# Patient Record
Sex: FEMALE | Race: WHITE
[De-identification: names, ages, dates, MRNs, and addresses within clinical notes are randomized per-mention and may not be internally consistent; named-entity substitution may affect disease eponyms.]

---

## 2017-03-21 ENCOUNTER — HOSPITAL ENCOUNTER (OUTPATIENT)
Dept: HOSPITAL 35 - PAIN | Age: 62
End: 2017-03-21
Attending: ANESTHESIOLOGY
Payer: COMMERCIAL

## 2017-03-21 VITALS — DIASTOLIC BLOOD PRESSURE: 69 MMHG | SYSTOLIC BLOOD PRESSURE: 122 MMHG

## 2017-03-21 VITALS — BODY MASS INDEX: 24.8 KG/M2 | WEIGHT: 140 LBS | HEIGHT: 62.99 IN

## 2017-03-21 DIAGNOSIS — M79.662: Primary | ICD-10-CM

## 2017-03-21 DIAGNOSIS — F32.9: ICD-10-CM

## 2017-03-21 DIAGNOSIS — I10: ICD-10-CM

## 2017-03-21 DIAGNOSIS — M79.1: ICD-10-CM

## 2019-01-29 ENCOUNTER — HOSPITAL ENCOUNTER (OUTPATIENT)
Dept: HOSPITAL 35 - PAIN | Age: 64
End: 2019-01-29
Attending: ANESTHESIOLOGY
Payer: COMMERCIAL

## 2019-01-29 VITALS — BODY MASS INDEX: 25.09 KG/M2 | WEIGHT: 141.6 LBS | HEIGHT: 62.99 IN

## 2019-01-29 VITALS — DIASTOLIC BLOOD PRESSURE: 77 MMHG | SYSTOLIC BLOOD PRESSURE: 120 MMHG

## 2019-01-29 DIAGNOSIS — Z79.899: ICD-10-CM

## 2019-01-29 DIAGNOSIS — M47.22: Primary | ICD-10-CM

## 2019-01-29 NOTE — NUR
Pain Clinic Assessment:
 
1. History of Osteoarthritis:
Not Applicable
   History of Rheumatoid Arthritis:
Not Applicable
 
2. Height: 5 ft. 3 in. 160.0 cm.
   Weight: 141.6 lb.  oz. 64.229 kg.
   Patient's BMI: 25.1
 
3. Vital Signs:
   BP: 120/77 Pulse: 83 Resp: 16
   Temp:  02 Sat: 100 ECG Mon:
 
4. Pain Intensity: 9
 
5. Fall Risk:
   Dizziness: N  Needs help standing or walking: N
   Fallen in the last 3 months: N
   Fall risk comments:
 
 
6. Patient on Blood Thinner: None
 
7. History of Hypertension: Y
 
8. Opioid Therapy greater than 6 weeks: N
   Opiate Contract Signed:
 
9. Risk Assessment Tool Provided:
 
10. Functional Assessment Tool:
 
11. Recreational Drug Use: Never Drug Type:
    Tobacco Use: Never Smoker Tobacco Type:
       Amount or Packs/day:  How Many Years:
    Alcohol Use: No  Frequency:  Quant:

## 2019-01-29 NOTE — HPC
[facility]
[place]   [address]                     PAIN MANAGEMENT CONSULTATION  
_______________________________________________________________________________
 
Name:       AWILDAKASHMIR KIRA                Room #:                     [facility] 
M.R.#:      [id number]                       Account #:      [id number]  
Admission:  01/29/19    Attend Phys:    Benigno Silva DO  
Discharge:              Date of Birth:  [birth year]  
                                                          Report #: [id number]
                                                                    [id number]   
_______________________________________________________________________________
THIS REPORT FOR:   //name//                          
 
CC: Gabbi Silva
 
DATE OF SERVICE:  01/29/2019
 
 
CHIEF COMPLAINT:  Neck pain, left upper extremity pain and paresthesias.
 
HISTORY OF PRESENT ILLNESS:  As you know, the patient is a 63-year-old female
who reports acute onset of neck pain, left upper extremity pain and paresthesias
that began about 15 days ago.  The patient states she has been evaluated by
Neurology just recently who diagnosed her with a C5-C6 cervical radiculopathy. 
This was due to right-sided issues at that time.  She indicates today pain is
sharp, dull, numbness and tingling, places current pain score 9/10.  Pain is
exacerbated with activity, improves with nothing to date.  She has been referred
back to our clinic to discuss the possibility of undergoing a cervical epidural
injection under fluoroscopic guidance to address suspected cervical
radiculopathy.  The patient has planned to undergo MRI of the cervical spine
this Friday, which has been planned by her PCP.  The patient indicates no injury
or trauma that may have led to symptom occurrence.  She states she went to bed
and was in her normal state of health, awoke that next morning with pain that
has progressively worsened.
 
ALLERGIES:  HYDROCODONE, ACETAMINOPHEN, AUGMENTIN.
 
CURRENT MEDICATIONS:  Atorvastatin 20 mg per day, spironolactone 50 mg twice a
day, potassium bicarbonate 25 mEq p.r.n., diltiazem  mg per day,
hydrochlorothiazide 25 mg per day, nortriptyline 50 mg p.o. at bedtime,
rizatriptan 10 mg p.r.n., VESIcare 10 mg once a day, gabapentin 100 mg 3 times a
day, Tylenol #3 with Codeine 1 tab p.o. q.6 hours p.r.n. for pain, topiramate 25
mg b.i.d.
 
SOCIAL HISTORY:  The patient denies tobacco, alcohol, IV or illicit drug use. 
She is working, not receiving workmen's compensation, accompanied by her
, present in room today.
 
IMAGING:  There is no new imaging available.
 
PHYSICAL EXAMINATION:
VITAL SIGNS:  Blood pressure 120/77, pulse 83, respiratory rate 16 and
unlabored.  The patient is 100% on room air.  Height 5 feet 3 inches tall,
weight 141.6 pounds, BMI calculated 25.1.
GENERAL:  Well-developed, well-nourished, well-hydrated 63-year-old female
 
 
 
[place] [address]                     PAIN MANAGEMENT CONSULTATION  
_______________________________________________________________________________
 
Name:       KASHMIR KAISER KIRA                Room #:                     REG DARI FAY#:      [id number]                       Account #:      [id number]  
Admission:  01/29/19    Attend Phys:    Benigno Silva DO  
Discharge:              Date of Birth:  [birth year]  
                                                          Report #: [id number]
                                                                    [id number]   
_______________________________________________________________________________
appearing stated age.  She is placing current pain score at 9/10.
HEENT:  Normocephalic, atraumatic.  Pupils equal, round, reactive to light. 
Extraocular muscles are intact.  Sclerae nonicteric without injection.
NEUROLOGIC:  Cranial nerves 2-12 grossly intact.  Speech is fluent.  The patient
deemed a good historian.
LUNGS:  Clear, no wheeze, rhonchi or rales.
CARDIOVASCULAR:  Regular.  No appreciable gallop or rub.
EXTREMITIES:  Show no clubbing, no cyanosis, no edema.
MUSCULOSKELETAL:  Upper extremity strength appears symmetrical, 5/5.  Slight
giveaway strength noted with biceps flexion on the left when compared to the
right.  This is due to pain generation.  Muscle bulk and tone is symmetrical
when comparing left upper extremity to right.  Deep tendon reflexes are
symmetrical in bilateral upper extremities.  No focal deficits.  Spurling's test
positive left, negative right.  Cervical provocation testing is met with
increasing pain and decreasing capability of rotation and lateral flexion to the
left.  This is moderate.
 
ASSESSMENT:
1.  Cervical radiculopathy.
2.  Cervical spondylosis with radiculopathy.
 
PLAN:
1.  The patient has returned to our clinic per the request of her PCP to discuss
treatment options for cervical radiculopathy.  She comes to us today with no new
imaging, but does have recently a study for EMG of the upper extremities, which
showed right C5-C6 cervical radiculopathy.  She has been provided a prescription
to undergo MRI of the cervical spine on Friday of this week.  She was referred
to our clinic then to discuss treatment options.  We discussed the following
treatment options with the patient today for treating cervical radicular
symptoms.
 
We discussed physical therapy, stretching exercises and traction techniques as a
treatment option.  The patient is trying conservative stretching exercises at
home, but has not noted much in the way of improvement in symptoms.  We have
discussed medication management, adding neuropathic pain medication.  At
present, she is taking gabapentin 200 mg in morning, 200 mg at noon and 300 mg
at night, is noting no improvement in symptoms.  We can certainly escalate this
dose much more rapidly seeking for improvement in symptoms with reduced side
effects.  We can also rotate the patient to another medication.  We discussed
the cervical epidural injections under fluoroscopic guidance requested, but we
did indicate that if the patient undergoes a cervical epidural injection today,
she would not be able to undergo a cervical MRI as the injection will obscure
the findings of the MRI and we would not be able to get a true and complete
understanding of the pathology.  We typically require our patients to undergo
MRI imaging no sooner than 2 weeks after an epidural injection.  We also
discussed the potential surgical options.  After reviewing risks and benefits of
 
 
 
[facility]
[address]
[place]   [address]                     PAIN MANAGEMENT CONSULTATION  
_______________________________________________________________________________
 
Name:       KASHMIR KAISER KIRA                Room #:                     REG CLDARI FAY#:      [id number]                       Account #:      [id number]  
Admission:  01/29/19    Attend Phys:    Benigno Silva DO  
Discharge:              Date of Birth:  [birth year]  
                                                          Report #: [id number]
                                                                    [id number]   
_______________________________________________________________________________
all proposed treatment options, the patient chose to make adjustments in her
medication therapy and to discuss epidural injection after undergoing MRI.
 
We will discontinue the patient's gabapentin.  She is taking 200 mg in the
morning, 200 mg at noon, 300 mg at night and is receiving no benefit.  Would
recommend we discontinue gabapentin transition to Lyrica 75 mg dose 1 tab p.o.
at bedtime for tonight then 75 mg in morning and 75 mg at night starting for 2
days, then increase to 75 mg in the morning, 150 mg at night for 2 days and then
if necessary up to 150 mg b.i.d.  We are utilizing a rapid escalation of
medication to try to control pain so that the patient can undergo her imaging
studies and be able to continue to do daily activities, which she is unable to
at present.  She was given samples of the Lyrica 75 mg tablets to titrate as
directed.  She is advised to watch for side effects of somnolence, decreased
mental acuity, disorientation, confusion, mental slowing.  If she notes any side
effects, reduce to the dose prior, continue the medication until contacting our
clinic.
2.  The patient will undergo an MRI of the cervical spine.  She has had her most
recent MRI at diagnostic imaging.  I would recommend that she return to
[facility] to undergo the MRI of the cervical spine so that comparison
can be made to her prior imaging study of 03/13/2017.  This will give us an idea
of the changes that may have occurred so that we can help direct the care more
effectively.  We will defer to the primary team if they wish to have the patient
undergo the imaging at diagnostic imaging or at another facility.  The patient
was advised when she returns to our clinic to please bring a CD of her imaging
study and we will review those findings at that next visit.
3.  We will see the patient back in followup visit once she has completed her
MRI and we will review the findings.  We will discuss at that point more
aggressive treatment options if medications are not effective at treating her
symptoms.
 
 
 
 
 
 
 
 
 
 
 
 
 
 
 
                         
   By:                               
                   
D: 01/29/19 1247                           _____________________________________
T: 01/29/19 1828                           Benigno Silva DO            /nt

## 2019-02-05 ENCOUNTER — HOSPITAL ENCOUNTER (OUTPATIENT)
Dept: HOSPITAL 35 - PAIN | Age: 64
Discharge: HOME | End: 2019-02-05
Attending: ANESTHESIOLOGY
Payer: COMMERCIAL

## 2019-02-05 VITALS — BODY MASS INDEX: 25.98 KG/M2 | WEIGHT: 146.6 LBS | HEIGHT: 62.99 IN

## 2019-02-05 VITALS — SYSTOLIC BLOOD PRESSURE: 112 MMHG | DIASTOLIC BLOOD PRESSURE: 74 MMHG

## 2019-02-05 DIAGNOSIS — G89.29: ICD-10-CM

## 2019-02-05 DIAGNOSIS — Z79.899: ICD-10-CM

## 2019-02-05 DIAGNOSIS — Z88.8: ICD-10-CM

## 2019-02-05 DIAGNOSIS — M47.22: Primary | ICD-10-CM

## 2019-02-05 NOTE — NUR
Pain Clinic Assessment:
 
1. History of Osteoarthritis:
Not Applicable
   History of Rheumatoid Arthritis:
Not Applicable
 
2. Height: 5 ft. 3 in. 160.0 cm.
   Weight: 146.6 lb.  oz. 66.497 kg.
   Patient's BMI: 26.0
 
3. Vital Signs:
   BP: 112/74 Pulse: 72 Resp: 16
   Temp:  02 Sat: 100 ECG Mon:
 
4. Pain Intensity: 8
 
5. Fall Risk:
   Dizziness: Y  Needs help standing or walking: N
   Fallen in the last 3 months: N
   Fall risk comments:
 
 
6. Patient on Blood Thinner: None
 
7. History of Hypertension: Y
 
8. Opioid Therapy greater than 6 weeks: N
   Opiate Contract Signed:
 
9. Risk Assessment Tool Provided:
 
10. Functional Assessment Tool:
 
11. Recreational Drug Use: Never Drug Type:
    Tobacco Use: Never Smoker Tobacco Type:
       Amount or Packs/day:  How Many Years:
    Alcohol Use: No  Frequency:  Quant:

## 2019-02-13 NOTE — HPC
[facility]
[place]   [address]                     PAIN MANAGEMENT CONSULTATION  
_______________________________________________________________________________
 
Name:       AWILDAKASHMIR KIRA                Room #:                     REG BRIGIDDARI 
MDISHA.#:      [id number]                       Account #:      [id number]  
Admission:  02/05/19    Attend Phys:    Benigno Silva DO  
Discharge:              Date of Birth:  [birth year]  
                                                          Report #: [id number]
                                                                    [id number]   
_______________________________________________________________________________
THIS REPORT FOR:   //name//                          
 
CC: Gabbi Silva
 
DATE OF SERVICE:  02/05/2019
 
 
REFERRING PHYSICIAN:  Gabbi Tobar M.D.
 
CHIEF COMPLAINT:  Neck pain, left upper extremity pain and paresthesias.
 
HISTORY OF PRESENT ILLNESS:  As you know, the patient is a 63-year-old female
who reported acute onset of neck pain, left upper extremity pain and
paresthesias that began in the early portion of January 2019.  She states she
has been evaluated by Neurology who diagnosed her with a C5-C6 cervical
radiculopathy.  She was sent to our clinic for evaluation for cervical
radiculopathy.  She was seen in consultation 01/29/2019 where we began the
process of preauthorization for the patient to undergo a cervical epidural
injection.  We made adjustments in her medication therapy, transitioning the
patient from gabapentin to Lyrica.  Despite this transition, the patient's
symptoms have continued.  She returns today in followup visit having received
authorization to undergo cervical epidural injection under fluoroscopic guidance
to address residual cervical radicular symptoms for which she places pain today
at approximately 8/10.
 
ALLERGIES:  HYDROCODONE, CLAVULANIC ACID and AMOXICILLIN.
 
CURRENT MEDICATIONS:  Lyrica, atorvastatin, spironolactone, potassium chloride,
diltiazem, hydrochlorothiazide, nortriptyline, rizatriptan, VESIcare and
topiramate.
 
SOCIAL HISTORY:  The patient denies tobacco, alcohol or IV or illicit drug use. 
She is working, not receiving workmen's compensation, unaccompanied today.
 
IMAGING DATA:  No new imaging available.
 
PHYSICAL EXAMINATION:
VITAL SIGNS:  Blood pressure 112/74, pulse 72 and respiratory rate 16 and
unlabored.  The patient is 100% on room air.  Height 5 feet 3 inches tall,
weight 146.6 pounds and BMI calculated 26.0.
GENERAL:  Well-developed, well-nourished, well-hydrated 63-year-old female
appearing stated age, placing current pain score at 8/10.
HEENT:  Normocephalic and atraumatic.  Pupils equal, round and reactive to
light.  Speech fluent.
 
 
 
[facility]
[address] [place] [address]                     PAIN MANAGEMENT CONSULTATION  
_______________________________________________________________________________
 
Name:       KASHMIR KAISER                Room #:                     REG CL 
M.R.#:      [id number]                       Account #:      [id number]  
Admission:  02/05/19    Attend Phys:    Benigno Silva DO  
Discharge:              Date of Birth:  [birth year]  
                                                          Report #: [id number]
                                                                    [id number]   
_______________________________________________________________________________
EXTREMITIES:  Show no clubbing, no cyanosis and no edema.
MUSCULOSKELETAL:  Upper extremity strength appears symmetrical again today 5/5,
muscle bulk and tone equal and symmetrical in comparing left lower extremity to
right.  There was some slight giveaway strength noted with biceps flexion on the
left when compared to the right and this is due to pain generation.  Deep tendon
reflexes are symmetrical when comparing bilateral upper extremities.  Spurling's
test positive left, negative right.
 
ASSESSMENT:
1.  Cervical radiculopathy.
2.  Cervical spondylosis with radiculopathy.
3.  Chronic intractable pain.
 
PLAN:
1.  The patient returns today in followup visit where we have reviewed recent
brachial plexus imaging, there is a 1.5 cm circumscribed intradural
extramedullary mass along the posterior aspect of the canal at T5, likely
representing a meningioma, not the source of the patient's upper extremity pain.
 No findings consistent with the patient's left upper extremity symptoms.  We
reviewed this with the patient today, she will need to follow up with her PCP in
regards to treatment options to address this finding at the T5 level causing no
current symptoms.
2.  The patient has returned today and has received authorization to undergo a
cervical epidural injection under fluoroscopic guidance.  We obtain
precertification and discussed with the patient the risks and benefits of the
procedure.  We discussed the risks that include but are not necessarily limited
to bleeding, bruising, infection, worsening pain, no relief of pain, also risk
of temporary or permanent muscle weakness, temporary or permanent nerve damage,
possible paralysis and death.  The patient states understood and wished to
proceed.
3.  No medication changes made at today's visit.  The patient to continue
current medical therapy as previously prescribed.
4.  We will see the patient back in followup visit on an as needed basis for the
next in the series of cervical epidural injections.
 
PROCEDURE NOTE
 
DESCRIPTION OF PROCEDURE:  C7-T1 cervical epidural steroid injection under
fluoroscopic guidance.
 
After obtaining written consent, the patient was taken back to fluoroscopy
suite, placed in prone position with separate pillows under chest and forehead
to decrease cervical lordosis.  Skin overlying the cervical area then prepped
and draped in aseptic fashion.  C7-T1 cervical interspace identified by AP
fluoroscopy.  Skin and subcutaneous tissue overlying target site injection
anesthetized with 3 mL of 1% lidocaine.
 
 
 
[address]   [address]                     PAIN MANAGEMENT CONSULTATION  
_______________________________________________________________________________
 
Name:       KASHMIR KAISER                Room #:                     REG MALORIE SIU#:      [id number]                       Account #:      [id number]  
Admission:  02/05/19    Attend Phys:    Benigno Silva DO  
Discharge:              Date of Birth:  [birth year]  
                                                          Report #: [id number]
                                                                    [id number]   
_______________________________________________________________________________
 
A 20-gauge 3-1/2 inch Tuohy needle advanced under fluoroscopic guidance towards
the epidural space using a midline approach.  Epidural space identified using
loss of resistance to air technique.  After negative aspiration for heme or
cerebrospinal fluid, 1 mL of Omnipaque injected.  A cervical epidurogram was
confirmed using both AP and lateral fluoroscopy.  After negative aspiration for
heme or cerebrospinal fluid, 5 mL of a solution containing 2 mL 40 mg per mL, 80
mg total triamcinolone, 3 mL of lidocaine 1% injected slowly.  Needle retracted
assisted, flushed with 1 mL of 1% lidocaine and removed.  Sterile bandage placed
over injection site.  No new motor deficits present in the upper extremities
following procedure.
 
The patient tolerated procedure well, carefully escorted to recovery room in
stable condition.  No apparent complications.  After meeting discharge criteria,
the patient discharged home.
 
 
 
 
 
 
 
 
 
 
 
 
 
 
 
 
 
 
 
 
 
 
 
 
 
 
 
 
 
  <ELECTRONICALLY SIGNED>
   By: Benigno Silva DO          
  02/13/19     0755
D: 02/12/19 0757                           _____________________________________
T: 02/12/19 0902                           Benigno Silva DO            /nt

## 2019-02-26 ENCOUNTER — HOSPITAL ENCOUNTER (OUTPATIENT)
Dept: HOSPITAL 35 - PAIN | Age: 64
End: 2019-02-26
Attending: ANESTHESIOLOGY
Payer: COMMERCIAL

## 2019-02-26 VITALS — SYSTOLIC BLOOD PRESSURE: 123 MMHG | DIASTOLIC BLOOD PRESSURE: 70 MMHG

## 2019-02-26 VITALS — BODY MASS INDEX: 24.95 KG/M2 | WEIGHT: 140.8 LBS | HEIGHT: 62.99 IN

## 2019-02-26 DIAGNOSIS — Z79.899: ICD-10-CM

## 2019-02-26 DIAGNOSIS — M47.22: Primary | ICD-10-CM

## 2019-02-26 DIAGNOSIS — G89.4: ICD-10-CM

## 2019-02-26 DIAGNOSIS — M50.10: ICD-10-CM

## 2020-10-12 ENCOUNTER — HOSPITAL ENCOUNTER (OUTPATIENT)
Dept: HOSPITAL 35 - CAT | Age: 65
End: 2020-10-12
Attending: INTERNAL MEDICINE
Payer: COMMERCIAL

## 2020-10-12 DIAGNOSIS — I25.10: ICD-10-CM

## 2020-10-12 DIAGNOSIS — E78.00: ICD-10-CM

## 2020-10-12 DIAGNOSIS — Z13.6: Primary | ICD-10-CM

## 2024-08-23 NOTE — NUR
Pain Clinic Assessment:
 
1. History of Osteoarthritis:
Not Applicable
   History of Rheumatoid Arthritis:
Not Applicable
 
2. Height: 5 ft. 3 in. 160.0 cm.
   Weight: 140.8 lb.  oz. 63.866 kg.
   Patient's BMI: 24.9
 
3. Vital Signs:
   BP: 123/70 Pulse: 63 Resp: 14
   Temp:  02 Sat: 100 ECG Mon:
 
4. Pain Intensity: 8
 
5. Fall Risk:
   Dizziness: N  Needs help standing or walking: N
   Fallen in the last 3 months: N
   Fall risk comments:
 
 
6. Patient on Blood Thinner: None
 
7. History of Hypertension: Y
 
8. Opioid Therapy greater than 6 weeks: N
   Opiate Contract Signed:
 
9. Risk Assessment Tool Provided:
 
10. Functional Assessment Tool:
 
11. Recreational Drug Use: Never Drug Type:
    Tobacco Use: Never Smoker Tobacco Type:
       Amount or Packs/day:  How Many Years:
    Alcohol Use: No  Frequency:  Quant: Unknown if ever smoked